# Patient Record
Sex: MALE | Race: WHITE | NOT HISPANIC OR LATINO | URBAN - METROPOLITAN AREA
[De-identification: names, ages, dates, MRNs, and addresses within clinical notes are randomized per-mention and may not be internally consistent; named-entity substitution may affect disease eponyms.]

---

## 2021-10-27 ENCOUNTER — OFFICE VISIT (OUTPATIENT)
Dept: URBAN - METROPOLITAN AREA CLINIC 92 | Facility: CLINIC | Age: 41
End: 2021-10-27
Payer: COMMERCIAL

## 2021-10-27 ENCOUNTER — WEB ENCOUNTER (OUTPATIENT)
Dept: URBAN - METROPOLITAN AREA CLINIC 92 | Facility: CLINIC | Age: 41
End: 2021-10-27

## 2021-10-27 DIAGNOSIS — R10.84 GENERALIZED ABDOMINAL PAIN: ICD-10-CM

## 2021-10-27 DIAGNOSIS — K21.9 GERD WITHOUT ESOPHAGITIS: ICD-10-CM

## 2021-10-27 PROCEDURE — 99244 OFF/OP CNSLTJ NEW/EST MOD 40: CPT | Performed by: INTERNAL MEDICINE

## 2021-10-27 PROCEDURE — 99204 OFFICE O/P NEW MOD 45 MIN: CPT | Performed by: INTERNAL MEDICINE

## 2021-10-27 NOTE — HPI-TODAY'S VISIT:
The patient was referred by Dr. Sandra Love for eval of abd pain (also referred by my pt Paul Redmond) .   A copy of this document is being forwarded to the referring provider. He notes 4-5m ago a sudden onset of generalized abd pain. Went to ER in [place] and per patient had CT scan that was negative for SBO, unsure if other findings. Labs also normal per patient. He then saw Dr. Love and had an EGD that showed some inflammation in the stomach (and per patient was put on ABX which he completed). Notes he did well for weeks after ABX but then had another episode of pain. Pain is sharp and generalized, lasts for a couple days and with time moves from upper to lower abdomen. Mild nausea but no vomiting, change in bowels, hematochezia or melena. Notes bloating  that is worse after eating. Notes reflux for years, classic heartburn and regurg as well as occasional sore throat. Currently on OTC nexium with control of GERD and prior to that was using prn antacids with mild improvement. ASA Q2 days and other NSAID use prn.

## 2021-10-27 NOTE — PHYSICAL EXAM GASTROINTESTINAL
Abdomen , soft, nontender, nondistended , no guarding or rigidity , no masses palpable , hypoactive bowel sounds , no hepatomegaly present , + well healed surgical scar

## 2023-05-22 ENCOUNTER — OUT OF OFFICE VISIT (OUTPATIENT)
Dept: URBAN - NONMETROPOLITAN AREA MEDICAL CENTER 3 | Facility: MEDICAL CENTER | Age: 43
End: 2023-05-22
Payer: COMMERCIAL

## 2023-05-22 DIAGNOSIS — R10.13 ABDOMINAL DISCOMFORT, EPIGASTRIC: ICD-10-CM

## 2023-05-22 DIAGNOSIS — K56.690 OTHER PARTIAL INTESTINAL OBSTRUCTION: ICD-10-CM

## 2023-05-22 PROCEDURE — G8427 DOCREV CUR MEDS BY ELIG CLIN: HCPCS | Performed by: STUDENT IN AN ORGANIZED HEALTH CARE EDUCATION/TRAINING PROGRAM

## 2023-05-22 PROCEDURE — 99222 1ST HOSP IP/OBS MODERATE 55: CPT | Performed by: STUDENT IN AN ORGANIZED HEALTH CARE EDUCATION/TRAINING PROGRAM

## 2023-05-22 PROCEDURE — 99232 SBSQ HOSP IP/OBS MODERATE 35: CPT | Performed by: STUDENT IN AN ORGANIZED HEALTH CARE EDUCATION/TRAINING PROGRAM

## 2023-05-26 ENCOUNTER — TELEPHONE ENCOUNTER (OUTPATIENT)
Dept: URBAN - NONMETROPOLITAN AREA CLINIC 4 | Facility: CLINIC | Age: 43
End: 2023-05-26

## 2023-07-20 ENCOUNTER — OFFICE VISIT (OUTPATIENT)
Dept: URBAN - NONMETROPOLITAN AREA CLINIC 4 | Facility: CLINIC | Age: 43
End: 2023-07-20

## 2023-11-08 ENCOUNTER — DASHBOARD ENCOUNTERS (OUTPATIENT)
Age: 43
End: 2023-11-08

## 2023-11-08 ENCOUNTER — OFFICE VISIT (OUTPATIENT)
Dept: URBAN - NONMETROPOLITAN AREA CLINIC 4 | Facility: CLINIC | Age: 43
End: 2023-11-08
Payer: COMMERCIAL

## 2023-11-08 VITALS
BODY MASS INDEX: 33.5 KG/M2 | WEIGHT: 234 LBS | HEIGHT: 70 IN | TEMPERATURE: 97.3 F | HEART RATE: 96 BPM | SYSTOLIC BLOOD PRESSURE: 138 MMHG | DIASTOLIC BLOOD PRESSURE: 76 MMHG

## 2023-11-08 DIAGNOSIS — K21.9 GERD WITHOUT ESOPHAGITIS: ICD-10-CM

## 2023-11-08 DIAGNOSIS — K56.609 SBO (SMALL BOWEL OBSTRUCTION): ICD-10-CM

## 2023-11-08 DIAGNOSIS — R10.84 GENERALIZED ABDOMINAL PAIN: ICD-10-CM

## 2023-11-08 DIAGNOSIS — E66.9 CLASS 1 OBESITY: ICD-10-CM

## 2023-11-08 DIAGNOSIS — R21 SKIN RASH: ICD-10-CM

## 2023-11-08 PROBLEM — 443371000124107: Status: ACTIVE | Noted: 2023-11-08

## 2023-11-08 PROBLEM — 266435005: Status: ACTIVE | Noted: 2021-10-27

## 2023-11-08 PROCEDURE — 99204 OFFICE O/P NEW MOD 45 MIN: CPT | Performed by: INTERNAL MEDICINE

## 2023-11-08 RX ORDER — BUPRENORPHINE AND NALOXONE 8; 2 MG/1; MG/1
2 TABLET UNDER THE TONGUE AND ALLOW TO DISSOLVE FILM BUCCAL; SUBLINGUAL
Status: ACTIVE | COMMUNITY

## 2023-11-08 NOTE — PHYSICAL EXAM GASTROINTESTINAL
Abdomen , soft, nontender, nondistended , Healed midline scar,  no guarding or rigidity , no masses palpable , hypoactive bowel sounds , no hepatomegaly present , + well healed surgical scar

## 2023-11-08 NOTE — HPI-TODAY'S VISIT:
The patient was referred by Dr. Sandra Love for eval of abd pain (also referred by my pt Paul Redmond) .   A copy of this document is being forwarded to the referring provider. He notes 4-5m ago a sudden onset of generalized abd pain. Went to ER in [place] and per patient had CT scan that was negative for SBO, unsure if other findings. Labs also normal per patient. He then saw Dr. Love and had an EGD that showed some inflammation in the stomach (and per patient was put on ABX which he completed). Notes he did well for weeks after ABX but then had another episode of pain. Pain is sharp and generalized, lasts for a couple days and with time moves from upper to lower abdomen. Mild nausea but no vomiting, change in bowels, hematochezia or melena. Notes bloating  that is worse after eating. Notes reflux for years, classic heartburn and regurg as well as occasional sore throat. Currently on OTC nexium with control of GERD and prior to that was using prn antacids with mild improvement. ASA Q2 days and other NSAID use prn.  Follow Up 11/8/23: Patient presents for routine follow up. He presented at [facility] in May 2023 for SBO. He had diagnostic lap followed by ex lap and right colectomy with path showing ? changes of ischemia. He has history of adhesiolysis in 1999. He is on Suboxone. He currently has nausea but no vomiting. He is on Zofran PRN which helps. He has loose stools which stopped after taking Suboxone under pain management. No use of NSAID's. He c/o scaly skin rash on his knees. No family history of IBD.

## 2023-11-09 LAB
A/G RATIO: 1.7
ABSOLUTE BASOPHILS: 49
ABSOLUTE EOSINOPHILS: 133
ABSOLUTE LYMPHOCYTES: 2114
ABSOLUTE MONOCYTES: 497
ABSOLUTE NEUTROPHILS: 4207
ALBUMIN: 4.1
ALKALINE PHOSPHATASE: 59
ALT (SGPT): 28
AST (SGOT): 20
BASOPHILS: 0.7
BILIRUBIN, TOTAL: 0.3
BUN/CREATININE RATIO: (no result)
BUN: 10
C-REACTIVE PROTEIN, QUANT: 3.9
CALCIUM: 9.1
CARBON DIOXIDE, TOTAL: 28
CHLORIDE: 104
CREATININE: 0.95
EGFR: 103
EOSINOPHILS: 1.9
FERRITIN, SERUM: 74
FOLATE (FOLIC ACID), SERUM: 11.3
GLOBULIN, TOTAL: 2.4
GLUCOSE: 81
HEMATOCRIT: 44.5
HEMOGLOBIN: 15.4
IRON BIND.CAP.(TIBC): 335
IRON SATURATION: 33
IRON: 111
LYMPHOCYTES: 30.2
MCH: 28.6
MCHC: 34.6
MCV: 82.7
MONOCYTES: 7.1
MPV: 10.7
NEUTROPHILS: 60.1
PLATELET COUNT: 310
POTASSIUM: 4.2
PROTEIN, TOTAL: 6.5
RDW: 13.4
RED BLOOD CELL COUNT: 5.38
SODIUM: 141
T4, FREE: 1
TSH W/REFLEX TO FT4: 5.2
VITAMIN B12: 235
VITAMIN D,25-OH,TOTAL,IA: 32
WHITE BLOOD CELL COUNT: 7

## 2023-11-26 LAB — CALPROTECTIN, FECAL: 26

## 2025-02-10 NOTE — PHYSICAL EXAM MUSCULOSKELETAL:
[facility] EMERGENCY DEPARTMENT  EMERGENCY DEPARTMENT ENCOUNTER      Pt Name: Boris Lipscomb  MRN: [id number]  Birthdate 1976  Date of evaluation: 2/10/2025  Provider: SATYA Brooks CNP      HISTORY OF PRESENT ILLNESS    Boris Lipscomb is a 48 y.o. male who presents to the Emergency Department with fall yesterday when slipped on ice falling down 3-4 steps.  Landing on his right side is having rib and flank pain.  Denies any head injury headache pain neck pain.        REVIEW OF SYSTEMS       Review of Systems   Constitutional:  Negative for activity change.   HENT: Negative.     Respiratory:  Negative for shortness of breath.    Cardiovascular: Negative.  Negative for chest pain.   Gastrointestinal:  Positive for abdominal pain. Negative for blood in stool and vomiting.   Endocrine: Negative.    Genitourinary:  Positive for flank pain. Negative for decreased urine volume and hematuria.   Musculoskeletal:  Negative for back pain.   Skin:  Negative for color change and wound.   Allergic/Immunologic: Negative.    Neurological:  Negative for dizziness, weakness and numbness.   Hematological: Negative.    Psychiatric/Behavioral: Negative.           PAST MEDICAL HISTORY     Past Medical History:   Diagnosis Date    Cancer (HCC)     Hypertension     Pancreatitis 1998         SURGICAL HISTORY       Past Surgical History:   Procedure Laterality Date    GROIN SURGERY Right 8/17/2023    I&D RIGHT GROIN performed by Robbin Stephens MD at [facility] OR    SHOULDER SURGERY           CURRENT MEDICATIONS       Previous Medications    SILDENAFIL (VIAGRA) 50 MG TABLET    Take 1 tablet by mouth daily as needed for Erectile Dysfunction (take 30-60min prior to sexual activity)       ALLERGIES     Codeine    FAMILY HISTORY     History reviewed. No pertinent family history.       SOCIAL HISTORY       Social History     Socioeconomic History    Marital status:      Spouse name: None    Number of children: None    Years of  normal gait and station , no tenderness or deformities present